# Patient Record
(demographics unavailable — no encounter records)

---

## 2024-10-21 NOTE — HISTORY OF PRESENT ILLNESS
[de-identified] : 88 year old female presents RT hand numbness. Began in August. No injury She has a hx of RT middle & ring triggering  [] : no [FreeTextEntry1] : marjorie hand  [FreeTextEntry3] : ~ august 2024 [FreeTextEntry5] : NKI, Right hand has been experiencing stiffness and pain since August  Patient fell on 9/6/24 and fractured her left hand.  H/O: CTS surgery on the left hand [FreeTextEntry6] : stiffness, numbness, stinging  [FreeTextEntry9] : warm water, using throughout the day  [de-identified] : pain only in the morning

## 2024-10-21 NOTE — DISCUSSION/SUMMARY
[de-identified] : Discussed the nature of the diagnosis and risk and benefits of different modalities of treatment. RT ring & middle TF Discussed conservative management as symptoms demand vs CSI. Pt would like a CSI for RT ring  Done today and tolerated well. All questions answered.

## 2024-10-21 NOTE — PROCEDURE
[FreeTextEntry3] : Tendon sheath was performed of the right 4th trigger finger. The indication for this procedure was pain and inflammation. The site was prepped with alcohol, ethyl chloride sprayed topically, and sterile technique used. An injection of Lidocaine 0.5cc of 1%, Triamcinolone (Kenalog) 0.5cc of 10 mg was used.  Patient tolerated procedure well.   The risks, benefits, and alternatives have been discussed, and verbal consent was obtained.

## 2024-11-18 NOTE — HISTORY OF PRESENT ILLNESS
[Localized] : localized [Tightness] : tightness [Occasional] : occasional [Injection therapy] : injection therapy [Retired] : Work status: retired [de-identified] : 88 year old female followed for right middle and ring trigger finger.  She is 4 weeks s/p injection RING finger and reports improvement..  She reports occasional numbness.  NO recent trauma   Sleep is not interrupted [] : no [FreeTextEntry1] : marjorie hand  [FreeTextEntry5] : Patient reports CSI helped with pain but is still experiencing numbness, tingling.  [FreeTextEntry6] : stiffness, numbness, [FreeTextEntry9] : warm water, using throughout the day, CSI  [de-identified] : holding hand in one position too long

## 2024-11-18 NOTE — DISCUSSION/SUMMARY
[de-identified] : Discussed the nature of the diagnosis and risk and benefits of different modalities of treatment. RT CTS added to dx today  She will splint at night  RTO 4 weeks

## 2024-11-18 NOTE — PHYSICAL EXAM
[Right] : right hand [Left] : left hand [] : negative Phalen's [FreeTextEntry3] : R hand:  Tender volar wrist  Good finger ROM  +Tinels  +Phalens  +Compression test

## 2024-12-30 NOTE — DISCUSSION/SUMMARY
[de-identified] : Discussed the nature of the diagnosis and risk and benefits of different modalities of treatment. RT CTS  RT little trigger finger added to dx Stressed the importance of splinting at night  Discussed possible CSI for RT little TF RTO 3 weeks

## 2024-12-30 NOTE — HISTORY OF PRESENT ILLNESS
[3] : 3 [1] : 2 [Tingling] : tingling [Intermittent] : intermittent [Ice] : ice [Heat] : heat [de-identified] : 88 year old female followed for right middle and ring trigger finger & RT CTS. She has not been splinting at night.   [] : no [FreeTextEntry1] :  hands  [FreeTextEntry6] : numbness  [de-identified] : prolonged activity

## 2024-12-30 NOTE — PHYSICAL EXAM
[Right] : right hand [4th] : 4th [5th] : 5th [A1-Pulley] : A1-pulley [Left] : left hand [] : negative Phalen's [FreeTextEntry3] : R hand:  Tender volar wrist  Good finger ROM  +Tinels  +Phalens  +Compression test

## 2025-04-14 NOTE — HISTORY OF PRESENT ILLNESS
[de-identified] : 88-year-old female presents to the neurosurgery office accompanied by her daughter for an initial office visit for evaluation of a cervical fracture.  The patient reports that injuries were sustained after a fall on February 1, 2025.  The patient was initially seen and evaluated at [facility] where the patient complained of some neck pain as well as a left hip fracture requiring left hip hemiarthroplasty.  The odontoid fracture was not noted until 5 days later when the patient was in [facility].  CT and MRI imaging was obtained and reviewed revealing this fracture.  She was placed in a hard cervical collar. The patient has been seeing the the neurosurgeon (Dr. Haynes) who discussed surgery versus nonsurgical intervention.  The patient is here today for second opinion.  She complains of some pain and stiffness, however denies any upper extremity radicular symptoms.  She does ambulate with assistive device and is currently using a rolling walker at this time.

## 2025-04-14 NOTE — PHYSICAL EXAM
[General Appearance - Alert] : alert [General Appearance - In No Acute Distress] : in no acute distress [General Appearance - Well Nourished] : well nourished [General Appearance - Well Developed] : well developed [General Appearance - Well-Appearing] : healthy appearing [] : normal voice and communication [Oriented To Time, Place, And Person] : oriented to person, place, and time [Impaired Insight] : insight and judgment were intact [Affect] : the affect was normal [Mood] : the mood was normal [Memory Recent] : recent memory was not impaired [Memory Remote] : remote memory was not impaired [Person] : oriented to person [Place] : oriented to place [Time] : oriented to time [Motor Handedness Right-Handed] : the patient is right hand dominant [FreeTextEntry8] : The patient ambulates with the aid of a rolling walker.  Sincere BLACKWELL cervical collar in place

## 2025-04-14 NOTE — ASSESSMENT
[FreeTextEntry1] : Discussed the history, physical examination findings, and recent imaging with the patient and her daughter with all questions answered.  The case has been reviewed with the neurosurgery attending who is also present for discussion with the patient.  We will continue conservative management with cervical spine immobilization.  Updated CT imaging has been ordered of the cervical spine to evaluate for alignment.  She will have the imaging performed closer to the next visit which will be in 1 month.  She will follow-up for review of the imaging on a day where the neurosurgery attending is also present in the office.

## 2025-04-14 NOTE — RESULTS/DATA
[FreeTextEntry1] : Finding: Facility: [facility] Clinical history: Odontoid fracture. Technique: An MR of the cervical spine was performed using sagittal T1, T2, STIR and axial T1, T2 and gradient-echo. Comparison: Cervical spine CT from earlier today. Findings: There is marrow edema at the level of the odontoid and C2 body compatible with the recently identified fracture. Curvilinear T2 hyperintense fracture lines are also seen at this level extending to the C2-3 disc space. Again identified is slight offsetting of the C1-C2 spinal laminar line by approximately 0.2 cm which may be due to posttraumatic or degenerative changes. There is approximately mild narrowing at the C1-C2 level. Evaluation is limited as axial images were not acquired through this level. There is edema in the C1-C2 interspinous region compatible with underlying soft tissue/ligamentous injury. No definite acute intraspinal hemorrhage is identified. There is straightening of cervical lordosis. No spinal cord lesions are identified. No paraspinal masses or collections are seen. No disc space fluid is identified to indicate discitis. At C2-C3 no disc herniation or significant spinal canal or foraminal stenosis is identified. At C3-C4 no disc herniation or significant spinal canal stenosis is identified. Uncovertebral osteophyte formation and facet hypertrophy contribute to approximately severe bilateral foraminal stenosis. At C4-C5 there may be a shallow central disc protrusion without significant spinal canal stenosis. Uncovertebral osteophyte formation and facet hypertrophy contribute to approximately moderate bilateral foraminal stenosis. At C5-C6 there is a diffuse disc osteophyte complex which is eccentric to the right. This indents on the ventral thecal sac and spinal cord, narrows the right lateral recess and contributes to mild spinal canal stenosis. There is approximately severe bilateral foraminal stenosis. There is moderate disc space narrowing. At C6-C7 there is a symmetric disc osteophyte complex which mildly indents the ventral thecal sac. No significant spinal canal stenosis is identified. There is approximately mild to moderate bilateral foraminal stenosis. There is severe disc space narrowing. At C7-T1 no disc herniation or significant spinal canal stenosis is identified. 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 Uncovertebral osteophyte formation contributes to approximately mild bilateral foraminal stenosis. Impression: 1. There is marrow edema at the level of the odontoid and C2 body compatible with the recently identified fracture. Curvilinear T2 hyperintense fracture lines are also seen at this level extending to the C2-3 disc space. Again identified is slight offsetting of the C1-C2 spinal laminar line by approximately 0.2 cm which may be due to posttraumatic or degenerative changes. There is approximately mild narrowing at the C1-C2 level. Evaluation is limited as axial images were not acquired through this level. There is edema in the C1-C2 interspinous region compatible with underlying soft tissue/ligamentous injury. No definite acute intraspinal hemorrhage is identified. 2. No spinal cord lesions are identified. 3. Degenerative changes as above.  Finding: Facility: [facility] CT CERVICAL SPINE WITHOUT IV CONTRAST 2/7/2025 3:51 PM CST HISTORY: : Female, 88 years old. neck pain, r/o hematoma or compression; Fracture of unspecified part of neck of right femur, initial encounter for closed fracture COMPARISON: No relevant priors. TECHNIQUE: Axial noncontrast CT images of the cervical spine were obtained with multiplanar reformations. This CT exam was performed using one or more of the following dose reduction techniques: automated exposure control, adjustment of the mA and/or kV according to patient's size, use of iterative reconstruction technique. ___________________________________ FINDINGS: The craniocervical and atlantoaxial alignment is normal. Nondisplaced fracture is seen through the odontoid base extending to the left lamina and foramen transversarium. The cervical lordosis is straightened. There is grade 1 anterolisthesis of C4 on C5. The cervical vertebral body heights are well maintained. There is no splaying of the disc spaces or posterior elements. No acute fracture is identified. There is severe degeneration of the left atlantoaxial joint. Degenerative disc disease is most severe between C5-6 and C6-7. Hypertrophic facet arthrosis is diffuse from C2-3 through C4-5 bilaterally. There is multilevel severe foraminal stenosis. No high-grade spinal canal stenosis. The thyroid glands are within normal limits. No pathologically enlarged cervical lymph node. The imaged lung apices are clear. ___________________________________ IMPRESSION: 1. Nondisplaced fracture through the odontoid base extending to the left lamina and foramen transversarium. 2. Grade 1 anterolisthesis of C4 on C5 is degenerative in nature. 3. Multilevel severe degenerative disc disease. 4. Diffuse hypertrophic facet arthrosis with multilevel severe foraminal stenosis. Soft tissue evaluation by CT is inherently limited. MRI has better tissue sensitivity for information regarding disc protrusion, bone marrow edema or nerve root compression. Electronically signed by: Marck Cummings MD 02/07/2025 05:08 PM [facility] [place] Workstation: [id number] Signed by: Marck Cummings on 2/7/2025 5:08 PM

## 2025-05-12 NOTE — ASSESSMENT
[FreeTextEntry1] : Discussed the history, physical examination findings, and recent imaging with the patient and her daughter with all questions answered.  The CT imaging has been reviewed which shows stable alignment of the C2 fracture.  At this time the patient may remove the cervical collar.  Discussed gentle range of motion as tolerated.  Physical therapy and pain management referrals provided today.  The patient may follow-up in 12 weeks to note progress.  The cervical spine imaging revealed a 5 to 6 mm left lung nodule not fully evaluated.  Discussed that she should discuss these findings with her primary care doctor for further imaging and evaluation.

## 2025-05-12 NOTE — HISTORY OF PRESENT ILLNESS
[de-identified] : 88-year-old female presents to the neurosurgery office accompanied by her daughter for follow-up evaluation of a cervical fracture (C2).  The patient reports that injuries were sustained after a fall on February 1, 2025.  The patient was initially seen and evaluated at [facility] where the patient complained of some neck pain as well as a left hip fracture requiring left hip hemiarthroplasty.  The odontoid fracture was not noted until 5 days later when the patient was in [facility].  CT and MRI imaging was obtained and reviewed revealing this fracture.  She was placed in a hard cervical collar. The patient has been seeing the neurosurgeon (Dr. Haynes) who discussed surgery versus nonsurgical intervention.  The patient is here today for second opinion.  She complains of some pain and stiffness, however denies any upper extremity radicular symptoms.  She does ambulate with assistive device. The patient has continued to wear the cervical collar, however she states that it is uncomfortable.  A recent CT scan of the cervical spine was ordered which the patient is here today to review. The patient has continued to be diligent with her cervical collar, however she reports that it is uncomfortable.  Updated CT imaging of the cervical spine was ordered at the last office visit which the patient is here today to review.

## 2025-05-12 NOTE — RESULTS/DATA
[FreeTextEntry1] : Finding: Facility: [facility] Clinical history: Odontoid fracture. Technique: An MR of the cervical spine was performed using sagittal T1, T2, STIR and axial T1, T2 and gradient-echo. Comparison: Cervical spine CT from earlier today. Findings: There is marrow edema at the level of the odontoid and C2 body compatible with the recently identified fracture. Curvilinear T2 hyperintense fracture lines are also seen at this level extending to the C2-3 disc space. Again identified is slight offsetting of the C1-C2 spinal laminar line by approximately 0.2 cm which may be due to posttraumatic or degenerative changes. There is approximately mild narrowing at the C1-C2 level. Evaluation is limited as axial images were not acquired through this level. There is edema in the C1-C2 interspinous region compatible with underlying soft tissue/ligamentous injury. No definite acute intraspinal hemorrhage is identified. There is straightening of cervical lordosis. No spinal cord lesions are identified. No paraspinal masses or collections are seen. No disc space fluid is identified to indicate discitis. At C2-C3 no disc herniation or significant spinal canal or foraminal stenosis is identified. At C3-C4 no disc herniation or significant spinal canal stenosis is identified. Uncovertebral osteophyte formation and facet hypertrophy contribute to approximately severe bilateral foraminal stenosis. At C4-C5 there may be a shallow central disc protrusion without significant spinal canal stenosis. Uncovertebral osteophyte formation and facet hypertrophy contribute to approximately moderate bilateral foraminal stenosis. At C5-C6 there is a diffuse disc osteophyte complex which is eccentric to the right. This indents on the ventral thecal sac and spinal cord, narrows the right lateral recess and contributes to mild spinal canal stenosis. There is approximately severe bilateral foraminal stenosis. There is moderate disc space narrowing. At C6-C7 there is a symmetric disc osteophyte complex which mildly indents the ventral thecal sac. No significant spinal canal stenosis is identified. There is approximately mild to moderate bilateral foraminal stenosis. There is severe disc space narrowing. At C7-T1 no disc herniation or significant spinal canal stenosis is identified. 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 Uncovertebral osteophyte formation contributes to approximately mild bilateral foraminal stenosis. Impression: 1. There is marrow edema at the level of the odontoid and C2 body compatible with the recently identified fracture. Curvilinear T2 hyperintense fracture lines are also seen at this level extending to the C2-3 disc space. Again identified is slight offsetting of the C1-C2 spinal laminar line by approximately 0.2 cm which may be due to posttraumatic or degenerative changes. There is approximately mild narrowing at the C1-C2 level. Evaluation is limited as axial images were not acquired through this level. There is edema in the C1-C2 interspinous region compatible with underlying soft tissue/ligamentous injury. No definite acute intraspinal hemorrhage is identified. 2. No spinal cord lesions are identified. 3. Degenerative changes as above.  Finding: Facility: [facility] CT CERVICAL SPINE WITHOUT IV CONTRAST 2/7/2025 3:51 PM CST HISTORY: : Female, 88 years old. neck pain, r/o hematoma or compression; Fracture of unspecified part of neck of right femur, initial encounter for closed fracture COMPARISON: No relevant priors. TECHNIQUE: Axial noncontrast CT images of the cervical spine were obtained with multiplanar reformations. This CT exam was performed using one or more of the following dose reduction techniques: automated exposure control, adjustment of the mA and/or kV according to patient's size, use of iterative reconstruction technique. ___________________________________ FINDINGS: The craniocervical and atlantoaxial alignment is normal. Nondisplaced fracture is seen through the odontoid base extending to the left lamina and foramen transversarium. The cervical lordosis is straightened. There is grade 1 anterolisthesis of C4 on C5. The cervical vertebral body heights are well maintained. There is no splaying of the disc spaces or posterior elements. No acute fracture is identified. There is severe degeneration of the left atlantoaxial joint. Degenerative disc disease is most severe between C5-6 and C6-7. Hypertrophic facet arthrosis is diffuse from C2-3 through C4-5 bilaterally. There is multilevel severe foraminal stenosis. No high-grade spinal canal stenosis. The thyroid glands are within normal limits. No pathologically enlarged cervical lymph node. The imaged lung apices are clear. ___________________________________ IMPRESSION: 1. Nondisplaced fracture through the odontoid base extending to the left lamina and foramen transversarium. 2. Grade 1 anterolisthesis of C4 on C5 is degenerative in nature. 3. Multilevel severe degenerative disc disease. 4. Diffuse hypertrophic facet arthrosis with multilevel severe foraminal stenosis. Soft tissue evaluation by CT is inherently limited. MRI has better tissue sensitivity for information regarding disc protrusion, bone marrow edema or nerve root compression. Electronically signed by: Marck Cummings MD 02/07/2025 05:08 PM [facility] [place] Workstation: [id number] Signed by: Marck Cummings on 2/7/2025 5:08 PM